# Patient Record
Sex: FEMALE
[De-identification: names, ages, dates, MRNs, and addresses within clinical notes are randomized per-mention and may not be internally consistent; named-entity substitution may affect disease eponyms.]

---

## 2019-03-04 ENCOUNTER — HOSPITAL ENCOUNTER (INPATIENT)
Dept: HOSPITAL 80 - F3N | Age: 69
LOS: 2 days | Discharge: HOME | DRG: 615 | End: 2019-03-06
Attending: NEUROLOGICAL SURGERY | Admitting: NEUROLOGICAL SURGERY
Payer: COMMERCIAL

## 2019-03-04 DIAGNOSIS — I10: ICD-10-CM

## 2019-03-04 DIAGNOSIS — Z86.718: ICD-10-CM

## 2019-03-04 DIAGNOSIS — D35.2: Primary | ICD-10-CM

## 2019-03-04 DIAGNOSIS — G47.33: ICD-10-CM

## 2019-03-04 DIAGNOSIS — Z86.711: ICD-10-CM

## 2019-03-04 PROCEDURE — 0GB04ZZ EXCISION OF PITUITARY GLAND, PERCUTANEOUS ENDOSCOPIC APPROACH: ICD-10-PCS | Performed by: NEUROLOGICAL SURGERY

## 2019-03-04 PROCEDURE — A9585 GADOBUTROL INJECTION: HCPCS

## 2019-03-04 PROCEDURE — 8E09XBZ COMPUTER ASSISTED PROCEDURE OF HEAD AND NECK REGION: ICD-10-PCS | Performed by: NEUROLOGICAL SURGERY

## 2019-03-04 RX ADMIN — FAMOTIDINE SCH MG: 20 TABLET, FILM COATED ORAL at 20:30

## 2019-03-04 RX ADMIN — Medication PRN MCG: at 14:21

## 2019-03-04 RX ADMIN — OXYCODONE HYDROCHLORIDE PRN MG: 15 TABLET ORAL at 18:22

## 2019-03-04 RX ADMIN — Medication PRN MCG: at 14:01

## 2019-03-04 RX ADMIN — DOCUSATE SODIUM AND SENNOSIDES SCH TAB: 50; 8.6 TABLET ORAL at 20:31

## 2019-03-04 RX ADMIN — ALLOPURINOL SCH: 100 TABLET ORAL at 15:37

## 2019-03-04 RX ADMIN — LOSARTAN POTASSIUM SCH: 50 TABLET, FILM COATED ORAL at 15:55

## 2019-03-04 RX ADMIN — KETOROLAC TROMETHAMINE PRN MG: 30 INJECTION, SOLUTION INTRAMUSCULAR at 15:42

## 2019-03-04 RX ADMIN — OXYCODONE HYDROCHLORIDE PRN MG: 15 TABLET ORAL at 20:29

## 2019-03-04 RX ADMIN — Medication PRN MCG: at 13:54

## 2019-03-04 RX ADMIN — ATENOLOL SCH: 100 TABLET ORAL at 15:53

## 2019-03-04 RX ADMIN — GABAPENTIN SCH: 300 CAPSULE ORAL at 15:55

## 2019-03-04 RX ADMIN — Medication PRN MCG: at 14:12

## 2019-03-04 RX ADMIN — GABAPENTIN SCH MG: 300 CAPSULE ORAL at 20:31

## 2019-03-04 RX ADMIN — ATORVASTATIN CALCIUM SCH MG: 10 TABLET, FILM COATED ORAL at 20:30

## 2019-03-04 RX ADMIN — SODIUM CHLORIDE AND POTASSIUM CHLORIDE SCH MLS: 9; 1.49 INJECTION, SOLUTION INTRAVENOUS at 16:04

## 2019-03-04 NOTE — PDANEPAE
ANE History of Present Illness


pituitary tumor here for transsphenoidal resection











ANE Past Medical History





- Cardiovascular History


Hx Hypertension: Yes


Hx Arrhythmias: No


Hx Chest Pain: No


Hx Coronary Artery / Peripheral Vascular Disease: No


Hx CHF / Valvular Disease: No


Hx Palpitations: No


Cardiovascular History Comment: DVT's AND SADDLE pe'S





- Pulmonary History


Hx COPD: No


Hx Asthma/Reactive Airway Disease: No


Hx Recent Upper Respiratory Infection: No


Hx Oxygen in Use at Home: No


Hx Sleep Apnea: Yes


Sleep Apnea Screening Result - Last Documented: Positive


Pulmonary History Comment: JONA uses CPAP





- Neurologic History


Hx Cerebrovascular Accident: No


Hx Seizures: No


Hx Dementia: No





- Endocrine History


Hx Diabetes: No





- Renal History


Hx Renal Disorders: No





- Liver History


Hx Hepatic Disorders: No





- Neurological & Psychiatric Hx


Hx Neurological and Psychiatric Disorders: No





- Cancer History


Hx Cancer: No





- Congenital Disorder History


Hx Congenital Disorders: No





- GI History


Hx Gastrointestinal Disorders: No





- Other Health History


Other Health History: DVT's with saddle PE's.  2/17





- Chronic Pain History


Chronic Pain: Yes (LOWER BACK)





- Surgical History


Prior Surgeries: lumpectomy 5/17





ANE Review of Systems


Review of Systems: 








- Exercise capacity


METS (RN): 4 METS





ANE Patient History





- Allergies


Allergies/Adverse Reactions: 








No Known Allergies Allergy (Verified 02/18/19 13:20)


 








- Home Medications


Home Medications: 








Allopurinol [Allopurinol 100 MG (*)] 200 mg PO DAILY 02/18/19 [Last Taken 03/04/ 19]


Apixaban [Eliquis] 2.5 mg PO BID 02/18/19 [Last Taken 03/01/19]


Ascorbic Acid [Vitamin C 500 mg (*)] 500 mg PO DAILY 02/18/19 [Last Taken 02/25/ 19]


Atenolol [Tenormin 100 mg (*)] 50 mg PO DAILY 02/18/19 [Last Taken 03/04/19]


Cyanocobalamin [Vitamin B12 (*)] 1,000 mcg PO DAILY 02/18/19 [Last Taken 02/25/ 19]


Gabapentin [Neurontin 300 MG (*)] 300 mg PO DAILY 02/18/19 [Last Taken 03/04/19]


Gabapentin [Neurontin 300 MG (*)] 600 mg PO HS 02/18/19 [Last Taken 03/03/19]


Herbals/Supplements -Info Only 1 each PO DAILY 02/18/19 [Last Taken Unknown]


Levothyroxine [Synthroid 75 mcg (*)] 75 mcg PO DAILY06 02/18/19 [Last Taken 03/ 04/19]


Losartan Potassium [Cozaar 50 mg (*)] 50 mg PO DAILY 02/18/19 [Last Taken 

Unknown]


Multivitamins [Multivitamin (*)] 1 each PO DAILY 02/18/19 [Last Taken 02/25/19 t

]


Simvastatin [Zocor] 20 mg PO HS 02/18/19 [Last Taken 03/03/19]


Triamterene/Hctz 75/50 [Maxzide 75-50 mg Tab (*)] 1 tab PO DAILY 02/18/19 [Last 

Taken 03/03/19]


amLODIPine BESYLATE [Norvasc 10 mg (*)] 10 mg PO DAILY 02/18/19 [Last Taken 03/ 04/19]


clonIDINE [Catapres-Tts (*)] 0.2 mg TD MO 02/18/19 [Last Taken 03/03/19]








- NPO status


NPO Status: no food or drink >8 hours


NPO Since - Liquids (Date): 03/03/19


NPO Since - Solids (Date): 03/03/19





- Anes Hx


Anes Hx: no prior problems





- Smoking Hx


Smoking Status: Never smoked





- Alcohol Use


Alcohol Use: None





- Family Anes Hx


Family Anes Hx: none


Family Hx Anesthesia Complications: none





ANE Labs/Vital Signs





- Vital Signs


Vital Signs: reviewed preoperatively; see RN documention for details


Heart Rate: 57


Respiratory Rate: 16


O2 Sat (%): 96


Height: 158.75 cm


Weight: 79.379 kg





ANE Physical Exam





- Airway


Neck exam: FROM


Mallampati Score: Class 2


Mouth exam: normal dental/mouth exam





- Pulmonary


Pulmonary: no respiratory distress, clear to auscultation





- Cardiovascular


Cardiovascular: regular rate and rhythym, no murmur, rub, or gallop





- ASA Status


ASA Status: III





ANE Anesthesia Plan


Anesthesia Plan: general endotracheal anesthesia


Lines/Monitors: arterial line, additional IV


Total IV Anesthesia: Yes

## 2019-03-04 NOTE — POSTOPPROG
Post Op Note


Date of Operation: 03/04/19


Surgeon: Abhijit Agustin


Assistant: Rola- second surgeon- ENT


Anesthesia: GET(General Endotracheal)


Pre-op Diagnosis: Pituitary mass


Post-op Diagnosis: same


Procedure: Transphenoidal resection of pituitary mass


Inf/Abcess present in the surg proc area at time of surgery?: No


Depth: Organ Space


EBL: Minimal





SOAP Progress Note


Assessment/Plan: 


Assessment:


























Plan:





03/04/19 15:31


S: Patient in PACU. Stable with minimal complaints of pain.





O: 


NAD, VSS


PERRL, EOMI


CN II-XII grossly intact


Vision grossly intact


Nose- no drainage noted 


DIOR x4 to command and with good strength








A: 68 yo female sp transphenoidal resection of pituitary mass





P:


-Admit to ICU


-Close monitoring of Urine Output. If >400 in 2 hours or >250 per hour, RN to 

order stat serum sodium and urine spec grav and then call PA-C with results. 

Please wait until you have results of labs prior to calling PA-C


-Advance diet as tolerated


-No blowing nose or drinking through a straw for 6 weeks


-Encourage po intake- keep water by bedside at all times 


-Continue Galaviz until tomorrow 1200-if urine output ok can remove then


-May restart Eliquis on POD #5 


-DVT prophy: TEDs, SCDs, Lovenox to start POD#2


-Seen by Dr. Agustin in PACU


-Call with any questions or concerns. 


Objective: 





 Vital Signs











Temp Pulse Resp BP Pulse Ox


 


 36.4 C   71   20   162/74 H  98 


 


 03/04/19 15:27  03/04/19 15:27  03/04/19 15:27  03/04/19 15:27  03/04/19 15:27








 











 03/03/19 03/04/19 03/05/19





 05:59 05:59 05:59


 


Intake Total   1200


 


Output Total   650


 


Balance   550

## 2019-03-04 NOTE — PDMN
Medical Necessity


Medical necessity: MCG S640 Hypophysectomy, Nasal Approach, CPT 35485,  68 yo s/

p craniotomy transsphenoidal approach for pituitary tumor, [facility] only

## 2019-03-05 RX ADMIN — FAMOTIDINE SCH MG: 20 TABLET, FILM COATED ORAL at 20:42

## 2019-03-05 RX ADMIN — LOSARTAN POTASSIUM SCH MG: 50 TABLET, FILM COATED ORAL at 08:54

## 2019-03-05 RX ADMIN — KETOROLAC TROMETHAMINE PRN MG: 30 INJECTION, SOLUTION INTRAMUSCULAR at 20:41

## 2019-03-05 RX ADMIN — GABAPENTIN SCH MG: 300 CAPSULE ORAL at 08:49

## 2019-03-05 RX ADMIN — LEVOTHYROXINE SODIUM SCH MCG: 75 TABLET ORAL at 07:43

## 2019-03-05 RX ADMIN — KETOROLAC TROMETHAMINE PRN MG: 30 INJECTION, SOLUTION INTRAMUSCULAR at 08:45

## 2019-03-05 RX ADMIN — DOCUSATE SODIUM AND SENNOSIDES SCH TAB: 50; 8.6 TABLET ORAL at 08:51

## 2019-03-05 RX ADMIN — ALLOPURINOL SCH MG: 100 TABLET ORAL at 08:51

## 2019-03-05 RX ADMIN — OXYCODONE HYDROCHLORIDE PRN MG: 15 TABLET ORAL at 18:49

## 2019-03-05 RX ADMIN — SODIUM CHLORIDE AND POTASSIUM CHLORIDE SCH MLS: 9; 1.49 INJECTION, SOLUTION INTRAVENOUS at 01:45

## 2019-03-05 RX ADMIN — GABAPENTIN SCH MG: 300 CAPSULE ORAL at 20:41

## 2019-03-05 RX ADMIN — ATORVASTATIN CALCIUM SCH MG: 10 TABLET, FILM COATED ORAL at 20:42

## 2019-03-05 RX ADMIN — DOCUSATE SODIUM AND SENNOSIDES SCH TAB: 50; 8.6 TABLET ORAL at 20:42

## 2019-03-05 RX ADMIN — HYDROCORTISONE SCH MG: 10 TABLET ORAL at 20:43

## 2019-03-05 RX ADMIN — FAMOTIDINE SCH: 20 TABLET, FILM COATED ORAL at 08:56

## 2019-03-05 RX ADMIN — OXYCODONE HYDROCHLORIDE PRN MG: 15 TABLET ORAL at 06:12

## 2019-03-05 RX ADMIN — ATENOLOL SCH: 100 TABLET ORAL at 14:11

## 2019-03-05 RX ADMIN — ATENOLOL SCH MG: 50 TABLET ORAL at 09:46

## 2019-03-05 RX ADMIN — HYDROCORTISONE SCH MG: 10 TABLET ORAL at 09:47

## 2019-03-05 NOTE — SOAPPROG
SOAP Progress Note


Assessment/Plan: 


Assessment: 68 yo F POD #1 transphenoidal resection of pituitary tumor


























Plan:


neuro: stable and doing well overall


PT/OT


nasal packing per Dr Canela


watch for central DI, urine volumes have been stable so far


scd/jessica for dvt prophylaxis


patient seen by Dr Agustin this morning


please call with neuro changes





03/05/19 08:48





Subjective: 





mild headache with mild nasal drainage, no N/V. 


Objective: 





 Vital Signs











Temp Pulse Resp BP Pulse Ox


 


 36.7 C   77   13   143/81 H  93 


 


 03/05/19 07:00  03/05/19 08:00  03/05/19 08:00  03/05/19 08:00  03/05/19 08:00








 Laboratory Results





 03/05/19 05:58 





 











 03/04/19 03/05/19 03/06/19





 05:59 05:59 05:59


 


Intake Total  3695 


 


Output Total  2080 100


 


Balance  1615 -100








AAOx4, +FC


PERRL, EOMI, no facial droop


5/5


+ light touch





ICD10 Worksheet


Patient Problems: 


 Problems











Problem Status Onset


 


Pituitary adenoma Acute  














- ICD10 Problem Qualifiers


(1) Pituitary adenoma

## 2019-03-05 NOTE — GOP
[f rep st]



                                                                OPERATIVE REPORT





DATE OF OPERATION:  03/04/2019



SURGEON:  Abhijit Agustin MD



NEUROSURGEON:  Abhijit Agustin MD.



ANESTHESIA:  General endotracheal.



PREOPERATIVE DIAGNOSIS:  Benign pituitary tumor.



POSTOPERATIVE DIAGNOSIS:  Benign pituitary tumor.



PROCEDURE PERFORMED:  

1.  Endoscopic transsphenoidal resection of pituitary tumor.

2.  Use of stealth stereotactic navigation for volumetric gross total tumor resection.



FINDINGS:  Successful pituitary tumor resection.



SPECIMENS:  pituitary tumor.



ESTIMATED BLOOD LOSS:  Blood loss was 50 cc.



INDICATIONS:  The patient is a 69-year-old woman who had presented with an incidental finding of a pi
tuitary macroadenoma. Her hormones have been normal, and she was thought to have a small visual field
 defect, but ultimately was not found to have abnormality of the visual fields. Given the size of the
 mass, however, we had recommended resection. She presents electively today for this procedure.



DESCRIPTION OF PROCEDURE:  After informed consent was obtained from the patient, the patient was brou
ght to the operating room, and was placed in the supine position on the operating table. A formal ernie
e-out was performed, identifying the patient by name, medical record number and date of birth. Preope
rative antibiotics were given. Preoperative hydrocortisone was given, and the endotracheal tube was p
laced, and general endotracheal anesthesia was smoothly induced. The nose and face were then prepped.
 Dr. Canela's portion of procedure was 1st and she has dictated her approach under a separate dictat
ion. In brief, a nasoseptal rescue flap was elevated on the left side and pushed inferiorly. The sphe
noidotomy was then made and wide access to the sphenoid sinus was obtained. After we had stripped the
 mucosa from the sphenoid sinus, there was a small rent in the sella and using Kerrison punches and c
areful dissection, a wide opening in the sella was obtained. Using the navigation, we were able to lo
calize the area of the tumor between the cavernous sinuses, and the sella was opened toward the tuber
culum sellae superiorly and laterally out to the cavernous sinuses on both sides. The micro Doppler w
as used to check to be sure there were no vascular tones in the area, and the dura was then opened in
 a cruciate fashion. The yellowish tissue of the normal pituitary gland was visualized on the right s
roselia of the exposure and the whitish soft nature of the pituitary tumor was visualized toward the left
. Ring curettes were then used to remove the pituitary tumor in a piecemeal fashion. This was collect
ed and sent for permanent pathology. We continued in all directions removing tumor posteriorly and in
feriorly 1st and ultimately superiorly. The arachnoid membranes were well visualized and were coming 
down from the optic chiasm and were pulsatile. We were then able to inspect with the endoscope the ca
vity. Did not see any further tumor within the pituitary fossa. All bleeding was then controlled usin
g irrigation and Surgicel. The cavity was checked using the navigation to be sure that we had covered
 the entirety of the tumor. A piece of onlay DuraGen was then placed over the dural opening, which wa
s covered with a free mucosal graft which was harvested from the middle turbinate on the right side. 
This repair was then covered using DuraSeal and some Gelfoam. No CSF leak was visualized throughout t
he entire case. Dr. Canela then replaced the nasoseptal flap which we did not need to use as there w
as no CSF leak and placed splints and packing in both nares. The oropharynx was irrigated out and suc
tioned. The patient was then extubated in the operating room where she was transferred to the PACU in
 stable condition. There were no operative complications. I was scrubbed and present the entire proce
dure. All sponge and needle counts were correct at the end of the case.



CO-SURGEON:  Ginny Canela MD.



FLUIDS:  Per the anesthesia record.



URINE OUTPUT:  Per the anesthesia record.



DRAINS:  There were no drains.





Job #:  [id number]

## 2019-03-05 NOTE — SOAPPROG
SOAP Progress Note


Assessment/Plan: 


Assessment:





POD 1 TSS approach for pituitary macroadenoma removal. Doing well overall. No 

leak sxs. Has some bloody d/c anteriorly around packing but nothing 

posteriorly. Will plan to pull packs tomorrow. 


Reminded to continue CSF leak precautions




















Plan:





03/05/19 17:03





Subjective: 





doing well. has some bloody d/c from front.  No leak sxs. 


Objective: 


afvss ra


packs in place B


small amt of bloody d/c B. 


OP clear, no bleeding in post op


 Vital Signs











Temp Pulse Resp BP Pulse Ox


 


 36.4 C   68   19   130/67 H  97 


 


 03/05/19 12:28  03/05/19 16:00  03/05/19 15:00  03/05/19 16:00  03/05/19 16:00








 Laboratory Results





 03/05/19 14:00 





 











 03/04/19 03/05/19 03/06/19





 05:59 05:59 05:59


 


Intake Total  3695 


 


Output Total  2138 5604


 


Balance  1615 -1745














ICD10 Worksheet


Patient Problems: 


 Problems











Problem Status Onset


 


Pituitary adenoma Acute

## 2019-03-05 NOTE — ASMTCMCOM
CM Note

 

CM Note                       

Notes:

Pt is a 68 yo F who underwent resection of pituitary mass. Pt lives her . Pt was cleared by 

PT to go home. OT and SLP evals pending. 



CM to follow. 



Plan: TBD

 

Date Signed:  03/05/2019 02:26 PM

Electronically Signed By:LEVON Be

## 2019-03-06 VITALS — DIASTOLIC BLOOD PRESSURE: 72 MMHG | SYSTOLIC BLOOD PRESSURE: 134 MMHG

## 2019-03-06 RX ADMIN — GABAPENTIN SCH MG: 300 CAPSULE ORAL at 08:35

## 2019-03-06 RX ADMIN — DOCUSATE SODIUM AND SENNOSIDES SCH TAB: 50; 8.6 TABLET ORAL at 08:34

## 2019-03-06 RX ADMIN — LOSARTAN POTASSIUM SCH MG: 50 TABLET, FILM COATED ORAL at 08:35

## 2019-03-06 RX ADMIN — LEVOTHYROXINE SODIUM SCH MCG: 75 TABLET ORAL at 05:50

## 2019-03-06 RX ADMIN — FAMOTIDINE SCH MG: 20 TABLET, FILM COATED ORAL at 08:35

## 2019-03-06 RX ADMIN — ALLOPURINOL SCH MG: 100 TABLET ORAL at 08:35

## 2019-03-06 RX ADMIN — ATENOLOL SCH MG: 50 TABLET ORAL at 08:35

## 2019-03-06 NOTE — SOAPPROG
SOAP Progress Note


Assessment/Plan: 


Assessment: 70 yo F POD #2 transphenoidal resection of pituitary tumor


























Plan:


neuro: stable and doing well overall


PT/OT


nasal packing per Dr Canela


on steroids


watch for central DI, urine volumes have been stable so far


scd/jessica for dvt prophylaxis


possible dc home later today if ok with Dr Canela


please call with neuro changes





03/05/19 08:48





03/06/19 07:52





Subjective: 





minimal headache, no N/V. 


Objective: 





 Vital Signs











Temp Pulse Resp BP Pulse Ox


 


 37.0 C   63   14   130/65 H  96 


 


 03/06/19 00:00  03/06/19 04:00  03/06/19 04:00  03/06/19 00:00  03/06/19 04:00








 Laboratory Results





 03/05/19 14:00 





 











 03/05/19 03/06/19 03/07/19





 05:59 05:59 05:59


 


Intake Total 3695 4250 


 


Output Total 2080 3620 700


 


Balance 1615 630 -700








AAOx4, +FC


PERRL, EOMI, no facial droop


5/5


+ light touch








ICD10 Worksheet


Patient Problems: 


 Problems











Problem Status Onset


 


Pituitary adenoma Acute  














- ICD10 Problem Qualifiers


(1) Pituitary adenoma

## 2019-03-06 NOTE — SOAPPROG
SOAP Progress Note


Assessment/Plan: 


Assessment:





POD 2 TSS approach for pituitary macroadenoma removal. Doing well overall. No 

leak sxs. Has some bloody d/c anteriorly around packing but nothing 

posteriorly.  Pulled packs without any sig bleeding.


Reminded to continue CSF leak precautions, keep BP low.   RTC 10-14 days. 

















Plan:





03/05/19 17:03





03/06/19 12:53





Subjective: 





doing well, wants to go home.  no sig bleeding or leak sxs


Objective: 


AFVSS RA


packs in place


removed.  NC patent


splint on L side


No bleeding


 Vital Signs











Temp Pulse Resp BP Pulse Ox


 


 36.8 C   73   19   134/72 H  93 


 


 03/06/19 11:38  03/06/19 11:38  03/06/19 11:38  03/06/19 11:38  03/06/19 11:38








 Laboratory Results





 03/06/19 08:42 





 











 03/05/19 03/06/19 03/07/19





 05:59 05:59 05:59


 


Intake Total 3815 4800 


 


Output Total 5470 7467 695


 


Balance 2414 804 -456














ICD10 Worksheet


Patient Problems: 


 Problems











Problem Status Onset


 


Pituitary adenoma Acute

## 2019-03-09 NOTE — GOP
[f rep st]



                                                                OPERATIVE REPORT





DATE OF OPERATION:  03/04/2019



SURGEON:  Ginny Canela MD



ANESTHESIA:  General.



PREOPERATIVE DIAGNOSIS:  Pituitary microadenoma.



POSTOPERATIVE DIAGNOSIS:  Pituitary microadenoma.



PROCEDURE PERFORMED:  

1.  Endonasal endoscopic transphenoidal approach to the pituitary. 

2.  Bilateral lei bullosa excisions. 

3.  Stereotactic volumetric navigation of the paranasal sinuses in extraoral skull base utilizing the
 Celeno Fusion system.



FINDINGS:  The patient is found to have normal sinus anatomy.  The tumor was removed by Dr. Nikole reyes.  There was no CSF leak.





ESTIMATED BLOOD LOSS:  Minimal.



INDICATIONS:  The patient is a very pleasant woman who has a history of a pituitary micro adenoma yong
t was found and she had some symptoms and on imaging.  She was seen by Dr. Agustin and is felt she woul
d benefit from the above procedure.  He asked me if I would help him with the approach.



DESCRIPTION OF PROCEDURE:  The patient was first seen in the preoperative area where informed consent
 was obtained.  She was then brought back to the operating room where Anesthesia sedated and intubate
d her.  Head of the bed was turned 180 degrees.  She was prepped and draped in the normal fashion.  T
he fusion-guided head piece was placed on the forehead and then registered and confirmed to be tracki
ng accurately.  Once this was done, a universal time-out protocol was performed confirming the patien
t and the procedure.  I then placed epi-soaked pledgets within the nares bilaterally and after these 
had sufficient time to act they were removed.  At this point, a 0-degree scope was used to visualize 
the nasal cavity on both sides.  She had fairly large lei that were noted preoperatively on imagin
g and both of these did seem to inhibit some access secondary to the bulkiness and so at this point, 
an epinephrine pledget was placed medial to the middle turbinate on the left and then on the right, a
nd turbinate scissors were used to make an incision at the crotch of the middle turbinate and then cu
t this back to the basal melena and then down truncating the entire middle turbinate and this was rem
baldo.  It was sent off the field in case of need in the future.  The mucosal edges in some small blee
ding were cauterized using the endoscopic bipolar cautery.  At this point, I was able to visualize th
e superior turbinate.  The inferior one-third of the superior turbinate was taken down with hand inst
ruments and microdebrider and I was able to gently remove some of the posterior ethmoid partition yong
t was overlying the sphenoid face.  Once this was done, I was able to see the sphenoid os.  It was ge
ntly widened using the microdebrider and this was done superiorly and medially.  At this point, an ep
i pledget was placed at the face of the sphenoid.  On the left side, we turned our attention to the c
oncha bullosa on this side.  In this setting removing the entire middle turbinate was decided until i
t was necessary for access.  I made a small incision with a sickle knife in the midportion of the con
rowdy and then hand instruments and the grater were used to take down the lateral partition of the conc
ha, thereby giving us more space.  After this was done, the middle turbinate was then lateralized wit
h a Bricelyn until I was able to see the superior turbinate.  At this point, the inferior one-third of t
he superior turbinate was taken down and then the posterior ethmoid partitions were removed until I c
ab to the natural os of the sphenoid.  This was gently widened superiorly and medially.  Once this w
as done, we had previously planned to do a nasoseptal rescue flap in case we needed this and so about
 5 cc of 1% lidocaine with 1:100,000 epinephrine was injected into the septum on the left as well as 
inferior to the face of the sphenoid where the pedicle would be.  Once this had sufficient time to ac
t, a [place]-tipped Bovie on a long shaft was used to make an incision superiorly just to the superi
or aspect of the natural os coming along the septum anteriorly until the anterior edge of the middle 
turbinate.  I then made a down cutting incision just about a centimeter above the floor.  Once this w
as done, a caudal was used to elevate this flap down off the face of the sphenoid and off the septum 
until it was below the area where we would need the access and then it was kept here for safe keeping
.  Once this was done, I then had a complete access to the sphenoid on this left side.  Up and down-b
iting Kerrison's were used to take down the face of the sphenoid superiorly, the skull base laterally
 and then inferiorly to the floor of the sinus.  I then used hand instruments and the microdebrider t
o perform a posterior septectomy and then I widened the sphenoid on the right side until we had wide 
access of both sphenoids.  She did have a dominant right sphenoid but the tumor seemed to go off to t
he left a little bit.  She had multiple inter sinus septa.  These were taken down using a straight tr
ue cutting instrument until it was flush with the back wall.  We were able to see the optica carotid 
recesses bilaterally.  The mucosa was removed overlying the patella and at this point, we had good ac
cess.  Dr. Agustin came in and I turned the procedure over to him and I went to the other side of the 
ed.  I used the left side of the nose to advance my scope and adhesed the right side to access the tu
mor.  Throughout the whole procedure I was there holding the scope and giving him visualization.  He 
removed the tumor.  There was no CSF leak.  Once that was done, he placed a small underlay graft with
 DuraGen.  We then took the middle turbinate that had previously been removed from the right and radha
lynda the mucosa and a free mucosal graft was placed over this DuraGen making sure to keep the mucosal 
side external and then some DuraSeal was placed over this.  Once this was done, some Gel-Foam soaked 
in thrombin was placed in the sinus.  At this point I took back over the procedure.  I replaced the n
asoseptal flap and the glottic splint had been cut to size was placed on the left side and sutured to
 the septum.  I then placed Merocel that had been cut to size and placed in a gloved finger on either
 side to hold everything in place.  Her oropharynx and pharynx were suctioned out using an OG suction
 and once she was clean and dry and there was no active bleeding, all instruments were removed.  She 
was turned back over to the Anesthesia where she was awoken and extubated, taken to PACU in stable co
ndition.  There were no complications.  She tolerated the procedure well and all instrument and pledg
et counts were correct at the end of the procedure.



CO-SURGEON:  Abhijit Agustin MD.



COMPLICATIONS:  None.





Job #:  [id number]